# Patient Record
Sex: MALE | URBAN - METROPOLITAN AREA
[De-identification: names, ages, dates, MRNs, and addresses within clinical notes are randomized per-mention and may not be internally consistent; named-entity substitution may affect disease eponyms.]

---

## 2022-02-27 ENCOUNTER — NURSE TRIAGE (OUTPATIENT)
Dept: ADMINISTRATIVE | Facility: CLINIC | Age: 27
End: 2022-02-27

## 2022-02-28 NOTE — TELEPHONE ENCOUNTER
Additional Information   Negative: [1] Life-threatening reaction in the past to similar substance (e.g., food, insect bite/sting, medication, etc.) AND [2] < 2 hours since exposure   Negative: Wheezing, stridor, hoarseness, or difficulty breathing   Negative: [1] Tightness in the chest or throat AND [2] begins within 2 hours of exposure to allergic substance   Negative: Difficulty swallowing, drooling or slurred speech   Negative: Difficult to awaken or acting confused (e.g., disoriented, slurred speech)   Negative: Unresponsive, passed out or very weak   Negative: Other symptom of severe allergic reaction (Exception: Hives or facial swelling alone)   Negative: Sounds like a life-threatening emergency to the triager   Negative: [1] Widespread hives AND [2] onset > 2 hours after exposure to high-risk allergen (e.g., sting, nuts, 1st dose of antibiotic)   Negative: [1] Widespread itching AND [2] onset > 2 hours after exposure to high-risk allergen (e.g., sting, nuts, 1st dose of antibiotic)   Negative: [1] Face swelling AND [2] onset > 2 hours after exposure to high-risk allergen (e.g., sting, nuts, 1st dose of antibiotic)   Negative: [1] Widespread hives, itching or facial swelling AND [2] onset < 2 hours of exposure to high-risk allergen (e.g., sting, nuts, 1st dose of antibiotic)   Negative: [1] Vomiting or abdominal cramps AND [2] onset < 2 hours of exposure to high-risk allergen  (e.g., sting, nuts, 1st dose of antibiotic)   Negative: [1] Had epinephrine shot AND [2] no symptoms now   Negative: [1] Used asthma inhaler or neb AND [2] no symptoms now   Negative: [1] Took antihistamine (e.g., Benadryl) by mouth AND [2] no symptoms now    Protocols used: [id number]    Pt stated he is in [place] and took a new ADHA medication today. Stated he has a rash on his shoulders and his skin feels tight. Denies difficulty breathing or swallowing. Pt stated he took one Benadryl and asked if he can take  two. Advised okay to take 50 mg of Benadryl. Pt advised to go to the ED if facial swelling,tounge swelling, difficulty swallowing or difficulty breathing develops. Advised to seek medical attention if symptoms do not improve. Pt verbalized understanding.